# Patient Record
Sex: FEMALE | Race: OTHER
[De-identification: names, ages, dates, MRNs, and addresses within clinical notes are randomized per-mention and may not be internally consistent; named-entity substitution may affect disease eponyms.]

---

## 2019-11-14 PROBLEM — Z00.00 ENCOUNTER FOR PREVENTIVE HEALTH EXAMINATION: Status: ACTIVE | Noted: 2019-11-14

## 2019-11-19 ENCOUNTER — APPOINTMENT (OUTPATIENT)
Dept: PEDIATRIC ORTHOPEDIC SURGERY | Facility: CLINIC | Age: 24
End: 2019-11-19
Payer: MEDICAID

## 2019-11-19 DIAGNOSIS — M41.9 SCOLIOSIS, UNSPECIFIED: ICD-10-CM

## 2019-11-19 DIAGNOSIS — Z78.9 OTHER SPECIFIED HEALTH STATUS: ICD-10-CM

## 2019-11-19 DIAGNOSIS — M54.9 DORSALGIA, UNSPECIFIED: ICD-10-CM

## 2019-11-19 DIAGNOSIS — M40.05 POSTURAL KYPHOSIS, THORACOLUMBAR REGION: ICD-10-CM

## 2019-11-19 PROCEDURE — 99215 OFFICE O/P EST HI 40 MIN: CPT

## 2019-11-19 NOTE — DATA REVIEWED
[de-identified] : Scoliosis x-rays AP and lateral were done today.  There is no obvious abnormality.  There is no significant curvature of the spine on AP x-ray.  Current measures less than 10°. The disc heights are maintained.  Sagittal alignment is maintained.  Coronal balance is maintained.  There no vertebral abnormalities that were noticed.\par

## 2019-11-19 NOTE — HISTORY OF PRESENT ILLNESS
[FreeTextEntry1] :  Patient is here for consultation management of the scoliosis.  This was recently diagnosed.  There is no family history of scoliosis.  Patient has been experiencing back pain. This is nonradiating in nature. It does not limit patient from carrying out the activities of daily living. Patient does not take any pain medication except for over the counter medication occasionally. There are no specific aggravating or relieving factors. There is no history of any weakness in his legs, tingling, numbness, bladder bowel dysfunction.

## 2019-11-19 NOTE — PHYSICAL EXAM
[FreeTextEntry1] : General: Patient is awake and alert and in no acute distress . oriented to person, place, and time. well developed, well nourished, cooperative. \par \par Skin: The skin is intact, warm, pink, and dry over the area examined.  \par \par Eyes: normal conjuntiva, normal eyelids and pupils were equal and round. \par \par ENT: normal ears, normal nose and normal lips.\par \par Cardiovascular: There is brisk capillary refill in the digits of the affected extremity. They are symmetric pulses in the bilateral upper and lower extremities, positive peripheral pulses, brisk capillary refill, but no peripheral edema.\par \par Respiratory: The patient is in no apparent respiratory distress. They're taking full deep breaths without use of accessory muscles or evidence of audible wheezes or stridor without the use of a stethoscope, normal respiratory effort. \par \par Neurological: 5/5 motor strength in the main muscle groups of bilateral lower extremities, sensory intact in bilateral lower extremities. \par \par Musculoskeletal:. Neurological examination reveals a grade 5/5 muscle power.  Sensation is intact to crude touch and pinprick.  Deep tendon reflexes are 1+ with ankle jerk and knee jerk.  The plantars are bilaterally downgoing.  Superficial abdominal reflexes are symmetric and intact.  The biceps and triceps reflexes are 1+.  The Banks test is negative.\par  \par There is no hairy patch, lipoma, sinus in the back.  There is no pes cavus, asymmetry of calves, significant leg length discrepancy, or significant cafe au lait spots.\par  \par Examination of both the upper and lower extremity did not show any obvious abnormality.  There is no gross deformity.  Patient has got full range of motion of both the hips, knees, ankles, wrists, elbows, and shoulders.  Neck range of motion is full and free without any pain or spasm.  \par  \par Examination of the back reveals that the shoulders are level with the pelvis.  Patient is able to bend forwards to about 70 degrees and bend backwards about 30 degrees.  Lateral flexion is symmetrical and is pain free.  There are no specific areas of paraspinal or midline tenderness.  Patient does complain of lower back and midback as the area of pain.  Straight leg raising test is free to more than 70 degrees. Flip back test is negative. Fabere's test is negative. \par

## 2019-11-19 NOTE — ASSESSMENT
[FreeTextEntry1] : Nonstructural scoliosis postural kyphosis back pain\par \par For back pain,I explained these findings to the Patient  At this point in time, I am only recommending nonsurgical treatment.  Natural history was xplained. I am sending patient for therapy. Back and abdominal strengthening and conditioning exercises were shown. I am recommending follow up in four months.  However, if Patient is back pain free, patient should cancel the appointment. If there are any questions or concerns, I will be happy to address them.  \par \par 4 kyphosis, partial,I explained these findings to the Patient.  The natural history was explained.  Recommend physical therapy for this.  I am recommending follow up in 6 months.  Scoliosis PA and lateral x-rays will be done at follow up.  Patient is still growing and this curve can progress. If so, a brace will be given. If there are any questions or concerns, I would be happy to address them.  \par \par For scoliosis, nonstructural, As you're well aware curves measuring less than 10° are not considered structural scoliosis.  I have given patient physical therapy because of occasional back pain as well as bad posture.  The natural history was explained.  Patient has no significant spinal growth remaining.  This curve cannot progress so, I will recommend annual physical examination.   If there are questions or concerns I will be happy to address them. Thank you for sending such a wonderful patient to me and thank you for the courtesy of this consult.\par \par